# Patient Record
(demographics unavailable — no encounter records)

---

## 2024-11-25 NOTE — CONSULT LETTER
[FreeTextEntry1] : Dear Dr. Dunn,   I had the pleasure of seeing Connor for follow up today. Below is my note regarding the office visit today.    Thank you so very much for allowing me to participate in Connor's care. Please don't hesitate to call me should any questions or issues arise .   Sincerely, John Zendejas MD, FACS, FSPU Chief, Pediatric Urology Professor of Urology and Pediatrics [facility] [employer] President, [employer] Association - [place] Section Past-President, Societies for Pediatric Urology

## 2024-11-25 NOTE — HISTORY OF PRESENT ILLNESS
[TextBox_4] : Connor is a 9 y.o. female being seen in follow up for frequent febrile UTIs. History of frequent febrile UTIs since the age of 3 months. Last seen in office in 2022. She had been free of infections since then. 2 months ago had an afebrile UTI that was treated by PCP. Last 2 weeks with another UTI, afebrile, treated with Amoxicillin. Finished meds. Symptoms of incontinence and enuresis still present. She is toilet trained and does not have symptoms when she is free from infection. She has previously been on Nitrofurantoin prophylaxis that was stopped in December 2021. Saw GI for evaluation of functional constipation 7/27/2022, where patient was started on MiraLAX for constipation management.  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  A VCUG (7/19/22; images reviewed) did not demonstrate vesicoureteral reflux. Spinning top configuration of the urethra.   History reviewed: Positive culture documented 9/18/21 and 3/25/22 >100k E.coli. Patient seen at [facility] ED 5/14/22 for febrile UTI positive >100K E.coli, mother states she was prescribed antibiotics but never administered them. Mother reports history of constipation.

## 2024-11-25 NOTE — HISTORY OF PRESENT ILLNESS
[TextBox_4] : Connor is a 9 y.o. female being seen in follow up for frequent febrile UTIs. History of frequent febrile UTIs since the age of 3 months. Last seen in office in 2022. She had been free of infections since then. 2 months ago had an afebrile UTI that was treated by PCP. Last 2 weeks with another UTI, afebrile, treated with Amoxicillin. Finished meds. Symptoms of incontinence and enuresis still present. She is toilet trained and does not have symptoms when she is free from infection. She has previously been on Nitrofurantoin prophylaxis that was stopped in December 2021. Saw GI for evaluation of functional constipation 7/27/2022, where patient was started on MiraLAX for constipation management.  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  A VCUG (7/19/22; images reviewed) did not demonstrate vesicoureteral reflux. Spinning top configuration of the urethra.   History reviewed: Positive culture documented 9/18/21 and 3/25/22 >100k E.coli. Patient seen at [facility] ED 5/14/22 for febrile UTI positive >100K E.coli, mother states she was prescribed antibiotics but never administered them. Mother reports history of constipation.

## 2024-11-25 NOTE — REASON FOR VISIT
[Follow-Up Visit] : a follow-up visit [PCP] : ~pcp~ [Patient] : patient [Parents] : parents [TextBox_50] : frequent febrile UTIs

## 2024-11-25 NOTE — PHYSICAL EXAM
[Right tenderness] : no right tenderness [Left tenderness] : no left tenderness [Renomegaly] : no renomegaly

## 2024-11-25 NOTE — DATA REVIEWED
[FreeTextEntry1] : EXAMINATION: US RENAL AND PELVIS TODAY IN OFFICE FINDINGS:  OTHERWISE UNREMARKABLE KIDNEY AND PELVIC STRUCTURES  URINALYSIS OBTAINED IN OFFICE WITH NITRITE POSITIVE, OTHERWISE UNREMARKABLE

## 2024-11-25 NOTE — ASSESSMENT
[FreeTextEntry1] : Connor has history of frequent febrile UTIs. VCUG negative. Constipation history. Todays RBUS was unremarkable. Today's urinalysis obtained in office demonstrated NIT positive. We discussed findings, potential implications and options with the parents and decided on the following plan:  -Will send out urine for culture to r/o current infection -Continue timed voiding, discourage withholding urine, behavior modification and proper hygiene discussed -Start cranberry supplementation -Follow up in 4-6 weeks for EMG/Uroflow to assess for dysfunctional voiding  Parents expressed understanding of the plan and all questions were answered.

## 2024-11-25 NOTE — CONSULT LETTER
[FreeTextEntry1] : Dear Dr. Dunn,   I had the pleasure of seeing Connor for follow up today. Below is my note regarding the office visit today.    Thank you so very much for allowing me to participate in Connor's care. Please don't hesitate to call me should any questions or issues arise .   Sincerely, John Zendejas MD, FACS, FSPU Chief, Pediatric Urology Professor of Urology and Pediatrics [facility] [employer] President, American [employer] Association - [place] Section Past-President, Societies for Pediatric Urology

## 2025-01-10 NOTE — HISTORY OF PRESENT ILLNESS
[TextBox_4] : Connor is a 9 y.o. female being seen in follow up for frequent febrile UTIs. History of frequent febrile UTIs since the age of 3 months. Last seen in office in 2022. Symptoms of incontinence and enuresis still present. She is toilet trained and does not have symptoms when she is free from infection. She has previously been on Nitrofurantoin prophylaxis that was stopped in December 2021. Saw GI for evaluation of functional constipation 7/27/2022, where patient was started on MiraLAX for constipation management.  Continues with interval UTIs. Not on prophylaxis. Asymptomatic today.   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 Spinning top configuration of the urethra.  History reviewed: Positive culture documented 9/18/21 and 3/25/22 >100k E.coli. Patient seen at [facility] ED 5/14/22 for febrile UTI positive >100K E.coli, mother states she was prescribed antibiotics but never administered them. Mother reports history of constipation.

## 2025-01-10 NOTE — CONSULT LETTER
[FreeTextEntry1] : Dear Dr. Dunn,   I had the pleasure of seeing Connor for follow up today. Below is my note regarding the office visit today.    Thank you so very much for allowing me to participate in Connor's care. Please don't hesitate to call me should any questions or issues arise .    Sincerely,    John Zendejas MD, FACS, FSPU    Chief, Pediatric Urology    Professor of Urology and Pediatrics    [facility] [employer]    President, American Urological Association - [place] Section    Past-President, Societies for Pediatric Urology

## 2025-01-10 NOTE — REASON FOR VISIT
[Follow-Up Visit] : a follow-up visit [PCP] : ~pcp~ [Patient] : patient [Mother] : mother [TextBox_50] : EMG/Uroflow for recurrent UTI

## 2025-01-10 NOTE — CONSULT LETTER
[FreeTextEntry1] : Dear Dr. Dunn,   I had the pleasure of seeing Connor for follow up today. Below is my note regarding the office visit today.    Thank you so very much for allowing me to participate in Connor's care. Please don't hesitate to call me should any questions or issues arise .    Sincerely,    John Zendejas MD, FACS, FSPU    Chief, Pediatric Urology    Professor of Urology and Pediatrics    [facility] [employer]    President, American Urological Association - [place] Section    Past-President, Societies for Pediatric Urology

## 2025-01-10 NOTE — ASSESSMENT
[FreeTextEntry1] : Connor has history of frequent febrile UTIs. VCUG negative. Constipation history. Todays Pelvic US was unremarkable. EMG/Uroflow demonstrated an interrupted flow with bladder/sphincter dyssynergia, PVR 11.5%. We discussed findings, potential implications and options with the parents and decided on the following plan:  -Will send out urine for culture to r/o current infection -Consider restarting prophylaxis at follow up pending culture resuts -Continue timed voiding, discourage withholding urine, behavior modification and proper hygiene discussed -Continue cranberry supplementation -BFB in 4 weeks  Mother expressed understanding of the plan and all questions were answered.

## 2025-01-10 NOTE — DATA REVIEWED
[FreeTextEntry1] : EXAMINATION: US PELVIS TODAY IN OFFICE FINDINGS: UNREMARKABLE PELVIC STRUCTURES   EXAMINATION: EMG/UROFLOW PERFORMED IN THE OFFICE TODAY FINDINGS: INTERRUPTED FLOW WITH BLADDER SPHINCTER DYSSYNERGIA; PVR 18ML (11.5% OF TOTAL VOLUME)

## 2025-01-10 NOTE — HISTORY OF PRESENT ILLNESS
[TextBox_4] : Connor is a 9 y.o. female being seen in follow up for frequent febrile UTIs. History of frequent febrile UTIs since the age of 3 months. Last seen in office in 2022. Symptoms of incontinence and enuresis still present. She is toilet trained and does not have symptoms when she is free from infection. She has previously been on Nitrofurantoin prophylaxis that was stopped in December 2021. Saw GI for evaluation of functional constipation 7/27/2022, where patient was started on MiraLAX for constipation management.  Continues with interval UTIs. Not on prophylaxis. Asymptomatic today.   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 Spinning top configuration of the urethra.  History reviewed: Positive culture documented 9/18/21 and 3/25/22 >100k E.coli. Patient seen at [facility] ED 5/14/22 for febrile UTI positive >100K E.coli, mother states she was prescribed antibiotics but never administered them. Mother reports history of constipation. Doxycycline Counseling:  Patient counseled regarding possible photosensitivity and increased risk for sunburn.  Patient instructed to avoid sunlight, if possible.  When exposed to sunlight, patients should wear protective clothing, sunglasses, and sunscreen.  The patient was instructed to call the office immediately if the following severe adverse effects occur:  hearing changes, easy bruising/bleeding, severe headache, or vision changes.  The patient verbalized understanding of the proper use and possible adverse effects of doxycycline.  All of the patient's questions and concerns were addressed.

## 2025-03-21 NOTE — REASON FOR VISIT
[Follow-Up Visit] : a follow-up visit [PCP] : ~pcp~ [Patient] : patient [Mother] : mother [TextBox_50] : Biofeedback

## 2025-03-21 NOTE — CONSULT LETTER
[FreeTextEntry1] : Dear Dr. Dunn,   I had the pleasure of seeing Connor for follow up today. Below is my note regarding the office visit today.    Thank you so very much for allowing me to participate in Connor's care. Please don't hesitate to call me should any questions or issues arise .    Sincerely,    John Zendejas MD, FACS, FSPU    Chief, Pediatric Urology    Professor of Urology and Pediatrics    [facility] [employer]    President, American Urological Association - [place] Section    Past-President, Societies for Pediatric Urology

## 2025-03-21 NOTE — ASSESSMENT
[FreeTextEntry1] : Connor has history of frequent febrile UTIs. VCUG negative. Constipation history. On Nitro prophylaxis without infections since starting. Here today for BFB 1/6. Able to differential external sphincter from pelvic floor. With overactive pelvic floor. We discussed findings, potential implications and options with the parents and decided on the following plan:  -Follow-up recommended within 8 weeks for the next session. Patient to continue with pelvic floor exercises at home 3 times daily until follow up.  -Continue timed voiding, discourage withholding urine, behavior modification and proper hygiene discussed -Continue cranberry supplementation  Parent and patient verbalized understanding of the plan and states all questions were addressed.

## 2025-03-21 NOTE — HISTORY OF PRESENT ILLNESS
[TextBox_4] : Connor is a 9 y.o. female being seen in follow up for frequent febrile UTIs. History of frequent febrile UTIs since the age of 3 months. Symptoms of incontinence and enuresis still present when infected. She is toilet trained and does not have symptoms when she is free from infection. Saw GI for evaluation of functional constipation 7/27/2022, where patient was started on MiraLAX for constipation management. Returns today for biofeedback. On Nitro prophylaxis. Remains infection free since starting prophylaxis.  Biofeedback session #1/6  Abdominal and pelvic leads placed appropriately & recommended scale set. Patient understood "squeezing pee muscles" in order to set scale for pelvic leads as well as "coughing" in order to set scale for abdominal leads.  Protocols:  'Quick Flicks'  Patient explained program (resting phase vs active phase). Patient remained attentive throughout procedure. Corrections made when engagement of lower extremities was initially noted when performing pelvic muscle contractions. Patient demonstrated continued improvement throughout the session. Able to maintain contraction during the active phase without efficient relaxation during resting phase. Able to trace templates as closely as possible. Patient reported that she was able to differentiate between pelvic muscle contractions & abdominal contractions.  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 Spinning top configuration of the urethra.

## 2025-03-21 NOTE — CONSULT LETTER
[FreeTextEntry1] : Dear Dr. Dunn,   I had the pleasure of seeing Connor for follow up today. Below is my note regarding the office visit today.    Thank you so very much for allowing me to participate in Connor's care. Please don't hesitate to call me should any questions or issues arise .    Sincerely,    John Zendejas MD, FACS, FSPU    Chief, Pediatric Urology    Professor of Urology and Pediatrics    [facility] [employer]    President, American Urological Association - [place] Section    Past-President, Societies for Pediatric Urology